# Patient Record
Sex: MALE | Race: WHITE | NOT HISPANIC OR LATINO | Employment: OTHER | URBAN - METROPOLITAN AREA
[De-identification: names, ages, dates, MRNs, and addresses within clinical notes are randomized per-mention and may not be internally consistent; named-entity substitution may affect disease eponyms.]

---

## 2023-01-01 ENCOUNTER — PATIENT OUTREACH (OUTPATIENT)
Dept: PRIMARY CARE | Facility: CLINIC | Age: 68
End: 2023-01-01
Payer: COMMERCIAL

## 2023-01-01 ENCOUNTER — OFFICE VISIT (OUTPATIENT)
Dept: CARDIOLOGY | Facility: CLINIC | Age: 68
End: 2023-01-01
Payer: COMMERCIAL

## 2023-01-01 ENCOUNTER — TELEPHONE (OUTPATIENT)
Dept: PRIMARY CARE | Facility: CLINIC | Age: 68
End: 2023-01-01
Payer: COMMERCIAL

## 2023-01-01 ENCOUNTER — OFFICE VISIT (OUTPATIENT)
Dept: PRIMARY CARE | Facility: CLINIC | Age: 68
End: 2023-01-01
Payer: COMMERCIAL

## 2023-01-01 ENCOUNTER — LAB (OUTPATIENT)
Dept: LAB | Facility: LAB | Age: 68
End: 2023-01-01
Payer: COMMERCIAL

## 2023-01-01 ENCOUNTER — OFFICE VISIT (OUTPATIENT)
Dept: CARDIOLOGY | Facility: HOSPITAL | Age: 68
End: 2023-01-01
Payer: COMMERCIAL

## 2023-01-01 ENCOUNTER — HOSPITAL ENCOUNTER (EMERGENCY)
Dept: DATA CONVERSION | Facility: HOSPITAL | Age: 68
Discharge: ANOTHER HOSPITAL | End: 2023-02-19
Attending: NURSE PRACTITIONER

## 2023-01-01 VITALS
RESPIRATION RATE: 18 BRPM | BODY MASS INDEX: 32.37 KG/M2 | DIASTOLIC BLOOD PRESSURE: 86 MMHG | SYSTOLIC BLOOD PRESSURE: 153 MMHG | WEIGHT: 219.2 LBS | HEART RATE: 57 BPM | OXYGEN SATURATION: 94 %

## 2023-01-01 VITALS
HEART RATE: 59 BPM | SYSTOLIC BLOOD PRESSURE: 124 MMHG | HEIGHT: 69 IN | BODY MASS INDEX: 32.14 KG/M2 | RESPIRATION RATE: 18 BRPM | OXYGEN SATURATION: 97 % | DIASTOLIC BLOOD PRESSURE: 84 MMHG | WEIGHT: 217 LBS

## 2023-01-01 VITALS
OXYGEN SATURATION: 97 % | DIASTOLIC BLOOD PRESSURE: 66 MMHG | BODY MASS INDEX: 32.91 KG/M2 | SYSTOLIC BLOOD PRESSURE: 118 MMHG | RESPIRATION RATE: 16 BRPM | WEIGHT: 222.2 LBS | HEIGHT: 69 IN | HEART RATE: 56 BPM

## 2023-01-01 VITALS
HEART RATE: 53 BPM | WEIGHT: 224 LBS | HEIGHT: 69 IN | SYSTOLIC BLOOD PRESSURE: 182 MMHG | DIASTOLIC BLOOD PRESSURE: 100 MMHG | BODY MASS INDEX: 33.18 KG/M2

## 2023-01-01 VITALS
HEIGHT: 69 IN | DIASTOLIC BLOOD PRESSURE: 70 MMHG | WEIGHT: 215 LBS | RESPIRATION RATE: 16 BRPM | HEART RATE: 55 BPM | OXYGEN SATURATION: 96 % | BODY MASS INDEX: 31.84 KG/M2 | SYSTOLIC BLOOD PRESSURE: 126 MMHG

## 2023-01-01 DIAGNOSIS — I10 PRIMARY HYPERTENSION: ICD-10-CM

## 2023-01-01 DIAGNOSIS — Z87.891 PERSONAL HISTORY OF NICOTINE DEPENDENCE: ICD-10-CM

## 2023-01-01 DIAGNOSIS — R79.9 ABNORMAL FINDING OF BLOOD CHEMISTRY, UNSPECIFIED: ICD-10-CM

## 2023-01-01 DIAGNOSIS — I10 PRIMARY HYPERTENSION: Primary | ICD-10-CM

## 2023-01-01 DIAGNOSIS — S27.339A: ICD-10-CM

## 2023-01-01 DIAGNOSIS — N18.6 END STAGE RENAL DISEASE (MULTI): ICD-10-CM

## 2023-01-01 DIAGNOSIS — K21.9 GASTROESOPHAGEAL REFLUX DISEASE WITHOUT ESOPHAGITIS: ICD-10-CM

## 2023-01-01 DIAGNOSIS — Z11.59 ENCOUNTER FOR HEPATITIS C SCREENING TEST FOR LOW RISK PATIENT: ICD-10-CM

## 2023-01-01 DIAGNOSIS — Z79.01 LONG TERM (CURRENT) USE OF ANTICOAGULANTS: ICD-10-CM

## 2023-01-01 DIAGNOSIS — K66.1 HEMOPERITONEUM: ICD-10-CM

## 2023-01-01 DIAGNOSIS — K21.9 GASTRO-ESOPHAGEAL REFLUX DISEASE WITHOUT ESOPHAGITIS: ICD-10-CM

## 2023-01-01 DIAGNOSIS — R40.2252 COMA SCALE, BEST VERBAL RESPONSE, ORIENTED, AT ARRIVAL TO EMERGENCY DEPARTMENT: ICD-10-CM

## 2023-01-01 DIAGNOSIS — I50.9 HEART FAILURE, UNSPECIFIED (MULTI): ICD-10-CM

## 2023-01-01 DIAGNOSIS — Z95.5 PRESENCE OF CORONARY ANGIOPLASTY IMPLANT AND GRAFT: ICD-10-CM

## 2023-01-01 DIAGNOSIS — S06.5X0A TRAUMATIC SUBDURAL HEMORRHAGE WITHOUT LOSS OF CONSCIOUSNESS, INITIAL ENCOUNTER (MULTI): ICD-10-CM

## 2023-01-01 DIAGNOSIS — S36.039A UNSPECIFIED LACERATION OF SPLEEN, INITIAL ENCOUNTER: ICD-10-CM

## 2023-01-01 DIAGNOSIS — I50.22 CHRONIC SYSTOLIC HEART FAILURE (MULTI): Primary | Chronic | ICD-10-CM

## 2023-01-01 DIAGNOSIS — R79.89 OTHER SPECIFIED ABNORMAL FINDINGS OF BLOOD CHEMISTRY: ICD-10-CM

## 2023-01-01 DIAGNOSIS — I50.22 CHRONIC SYSTOLIC HEART FAILURE (MULTI): ICD-10-CM

## 2023-01-01 DIAGNOSIS — J44.9 CHRONIC OBSTRUCTIVE PULMONARY DISEASE, UNSPECIFIED (MULTI): ICD-10-CM

## 2023-01-01 DIAGNOSIS — E78.49 OTHER HYPERLIPIDEMIA: ICD-10-CM

## 2023-01-01 DIAGNOSIS — J44.9 CHRONIC OBSTRUCTIVE PULMONARY DISEASE, UNSPECIFIED COPD TYPE (MULTI): ICD-10-CM

## 2023-01-01 DIAGNOSIS — Z99.2 DIALYSIS PATIENT (CMS-HCC): ICD-10-CM

## 2023-01-01 DIAGNOSIS — E03.9 ACQUIRED HYPOTHYROIDISM: ICD-10-CM

## 2023-01-01 DIAGNOSIS — R00.2 PALPITATION: ICD-10-CM

## 2023-01-01 DIAGNOSIS — N63.41 SUBAREOLAR MASS OF RIGHT BREAST: ICD-10-CM

## 2023-01-01 DIAGNOSIS — Z79.899 OTHER LONG TERM (CURRENT) DRUG THERAPY: ICD-10-CM

## 2023-01-01 DIAGNOSIS — Z09 HOSPITAL DISCHARGE FOLLOW-UP: Primary | ICD-10-CM

## 2023-01-01 DIAGNOSIS — S01.511A LACERATION WITHOUT FOREIGN BODY OF LIP, INITIAL ENCOUNTER: ICD-10-CM

## 2023-01-01 DIAGNOSIS — E78.5 HYPERLIPIDEMIA, UNSPECIFIED: ICD-10-CM

## 2023-01-01 DIAGNOSIS — R40.2142 COMA SCALE, EYES OPEN, SPONTANEOUS, AT ARRIVAL TO EMERGENCY DEPARTMENT: ICD-10-CM

## 2023-01-01 DIAGNOSIS — R07.81 PLEURODYNIA: ICD-10-CM

## 2023-01-01 DIAGNOSIS — R10.11 RIGHT UPPER QUADRANT PAIN: ICD-10-CM

## 2023-01-01 DIAGNOSIS — Z99.2 DEPENDENCE ON RENAL DIALYSIS (CMS-HCC): ICD-10-CM

## 2023-01-01 DIAGNOSIS — Z79.82 LONG TERM (CURRENT) USE OF ASPIRIN: ICD-10-CM

## 2023-01-01 DIAGNOSIS — R40.2362 COMA SCALE, BEST MOTOR RESPONSE, OBEYS COMMANDS, AT ARRIVAL TO EMERGENCY DEPARTMENT: ICD-10-CM

## 2023-01-01 DIAGNOSIS — M79.601 PAIN IN RIGHT ARM: ICD-10-CM

## 2023-01-01 DIAGNOSIS — S40.011A CONTUSION OF RIGHT SHOULDER, INITIAL ENCOUNTER: ICD-10-CM

## 2023-01-01 DIAGNOSIS — Z00.00 MEDICARE ANNUAL WELLNESS VISIT, SUBSEQUENT: Primary | ICD-10-CM

## 2023-01-01 DIAGNOSIS — I25.118 ATHEROSCLEROTIC HEART DISEASE OF NATIVE CORONARY ARTERY WITH OTHER FORMS OF ANGINA PECTORIS (CMS-HCC): Chronic | ICD-10-CM

## 2023-01-01 DIAGNOSIS — Z20.822 CONTACT WITH AND (SUSPECTED) EXPOSURE TO COVID-19: ICD-10-CM

## 2023-01-01 DIAGNOSIS — I13.2 HYPERTENSIVE HEART AND CHRONIC KIDNEY DISEASE WITH HEART FAILURE AND WITH STAGE 5 CHRONIC KIDNEY DISEASE, OR END STAGE RENAL DISEASE (MULTI): ICD-10-CM
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
## 2023-01-01 DIAGNOSIS — I25.118 ATHEROSCLEROTIC HEART DISEASE OF NATIVE CORONARY ARTERY WITH OTHER FORMS OF ANGINA PECTORIS (CMS-HCC): ICD-10-CM

## 2023-01-01 DIAGNOSIS — I25.2 OLD MYOCARDIAL INFARCTION: ICD-10-CM

## 2023-01-01 LAB
1,25(OH)2D3 SERPL-MCNC: 26.3 PG/ML (ref 19.9–79.3)
ABO GROUP (TYPE) IN BLOOD: NORMAL
ALBUMIN SERPL BCP-MCNC: 4.1 G/DL (ref 3.4–5)
ALP SERPL-CCNC: 75 U/L (ref 33–136)
ALT SERPL W P-5'-P-CCNC: 14 U/L (ref 10–52)
ANION GAP IN SER/PLAS: 16 MMOL/L (ref 10–20)
ANION GAP SERPL CALC-SCNC: 18 MMOL/L (ref 10–20)
ANTIBODY SCREEN: NORMAL
AST SERPL W P-5'-P-CCNC: 13 U/L (ref 9–39)
BASOPHILS # BLD AUTO: 0.09 X10*3/UL (ref 0–0.1)
BASOPHILS NFR BLD AUTO: 1.3 %
BILIRUB SERPL-MCNC: 0.6 MG/DL (ref 0–1.2)
BUN SERPL-MCNC: 71 MG/DL (ref 6–23)
CALCIUM (MG/DL) IN SER/PLAS: 9 MG/DL (ref 8.6–10.3)
CALCIUM SERPL-MCNC: 9 MG/DL (ref 8.6–10.3)
CARBON DIOXIDE, TOTAL (MMOL/L) IN SER/PLAS: 31 MMOL/L (ref 21–32)
CHLORIDE (MMOL/L) IN SER/PLAS: 97 MMOL/L (ref 98–107)
CHLORIDE SERPL-SCNC: 100 MMOL/L (ref 98–107)
CHOLEST SERPL-MCNC: 89 MG/DL (ref 0–199)
CHOLESTEROL/HDL RATIO: 2.2
CO2 SERPL-SCNC: 27 MMOL/L (ref 21–32)
CREAT SERPL-MCNC: 10.85 MG/DL (ref 0.5–1.3)
CREATININE (MG/DL) IN SER/PLAS: 6.79 MG/DL (ref 0.5–1.3)
EOSINOPHIL # BLD AUTO: 0.56 X10*3/UL (ref 0–0.7)
EOSINOPHIL NFR BLD AUTO: 7.8 %
ERYTHROCYTE DISTRIBUTION WIDTH (RATIO) BY AUTOMATED COUNT: 14.4 % (ref 11.5–14.5)
ERYTHROCYTE MEAN CORPUSCULAR HEMOGLOBIN CONCENTRATION (G/DL) BY AUTOMATED: 32.1 G/DL (ref 32–36)
ERYTHROCYTE MEAN CORPUSCULAR VOLUME (FL) BY AUTOMATED COUNT: 97 FL (ref 80–100)
ERYTHROCYTE [DISTWIDTH] IN BLOOD BY AUTOMATED COUNT: 14.6 % (ref 11.5–14.5)
ERYTHROCYTES (10*6/UL) IN BLOOD BY AUTOMATED COUNT: 2.88 X10E12/L (ref 4.5–5.9)
EST. AVERAGE GLUCOSE BLD GHB EST-MCNC: 123 MG/DL
ETHANOL (MG/DL) IN SER/PLAS: <10 MG/DL
FLU A RESULT: NOT DETECTED
FLU B RESULT: NOT DETECTED
GFR MALE: 8 ML/MIN/1.73M2
GFR SERPL CREATININE-BSD FRML MDRD: 5 ML/MIN/1.73M*2
GLUCOSE (MG/DL) IN SER/PLAS: 132 MG/DL (ref 74–99)
GLUCOSE SERPL-MCNC: 82 MG/DL (ref 74–99)
HBA1C MFR BLD: 5.9 %
HCT VFR BLD AUTO: 29.4 % (ref 41–52)
HCV AB SER QL: NONREACTIVE
HDLC SERPL-MCNC: 39.6 MG/DL
HEMATOCRIT (%) IN BLOOD BY AUTOMATED COUNT: 28 % (ref 41–52)
HEMOGLOBIN (G/DL) IN BLOOD: 9 G/DL (ref 13.5–17.5)
HGB BLD-MCNC: 9.7 G/DL (ref 13.5–17.5)
IMM GRANULOCYTES # BLD AUTO: 0.03 X10*3/UL (ref 0–0.7)
IMM GRANULOCYTES NFR BLD AUTO: 0.4 % (ref 0–0.9)
INR IN PPP BY COAGULATION ASSAY: 1.1 (ref 0.9–1.1)
LDLC SERPL CALC-MCNC: 39 MG/DL
LEUKOCYTES (10*3/UL) IN BLOOD BY AUTOMATED COUNT: 11.3 X10E9/L (ref 4.4–11.3)
LYMPHOCYTES # BLD AUTO: 1.03 X10*3/UL (ref 1.2–4.8)
LYMPHOCYTES NFR BLD AUTO: 14.4 %
MCH RBC QN AUTO: 32.3 PG (ref 26–34)
MCHC RBC AUTO-ENTMCNC: 33 G/DL (ref 32–36)
MCV RBC AUTO: 98 FL (ref 80–100)
MONOCYTES # BLD AUTO: 0.63 X10*3/UL (ref 0.1–1)
MONOCYTES NFR BLD AUTO: 8.8 %
NEUTROPHILS # BLD AUTO: 4.83 X10*3/UL (ref 1.2–7.7)
NEUTROPHILS NFR BLD AUTO: 67.3 %
NON HDL CHOLESTEROL: 49 MG/DL (ref 0–149)
NRBC BLD-RTO: 0 /100 WBCS (ref 0–0)
PATIENT TEMPERATURE: 37 DEGREES C
PLATELET # BLD AUTO: 208 X10*3/UL (ref 150–450)
PLATELETS (10*3/UL) IN BLOOD AUTOMATED COUNT: 242 X10E9/L (ref 150–450)
PMV BLD AUTO: 10.6 FL (ref 7.5–11.5)
POCT ANION GAP, VENOUS: 9 MMOL/L (ref 10–25)
POCT BASE EXCESS, VENOUS: 6.9 MMOL/L (ref -2–3)
POCT CHLORIDE, VENOUS: 97 MMOL/L (ref 98–107)
POCT GLUCOSE, VENOUS: 136 MG/DL (ref 74–99)
POCT HCO3, VENOUS: 31.9 MMOL/L (ref 22–26)
POCT HEMATOCRIT, VENOUS: 26 % (ref 41–52)
POCT HEMOGLOBIN, VENOUS: 8.6 G/DL (ref 13.5–17.5)
POCT IONIZED CALCIUM, VENOUS: 1.09 MMOL/L (ref 1.1–1.33)
POCT LACTATE, VENOUS: 0.8 MMOL/L (ref 0.4–2)
POCT OXY HEMOGLOBIN, VENOUS: 57.8 % (ref 45–75)
POCT PCO2, VENOUS: 47 MMHG (ref 41–51)
POCT PH, VENOUS: 7.44 (ref 7.33–7.43)
POCT PO2, VENOUS: 35 MMHG (ref 35–45)
POCT POTASSIUM, VENOUS: 4.2 MMOL/L (ref 3.5–5.3)
POCT SO2, VENOUS: 59 % (ref 45–75)
POCT SODIUM, VENOUS: 134 MMOL/L (ref 136–145)
POTASSIUM (MMOL/L) IN SER/PLAS: 4.5 MMOL/L (ref 3.5–5.3)
POTASSIUM SERPL-SCNC: 5.8 MMOL/L (ref 3.5–5.3)
PROT SERPL-MCNC: 6.2 G/DL (ref 6.4–8.2)
PROTHROMBIN TIME (PT) IN PPP BY COAGULATION ASSAY: 12.3 SEC (ref 9.8–13.4)
RBC # BLD AUTO: 3 X10*6/UL (ref 4.5–5.9)
RH FACTOR: NORMAL
SARS-COV-2 RESULT: NOT DETECTED
SODIUM (MMOL/L) IN SER/PLAS: 139 MMOL/L (ref 136–145)
SODIUM SERPL-SCNC: 139 MMOL/L (ref 136–145)
T4 FREE SERPL-MCNC: 0.81 NG/DL (ref 0.61–1.12)
TRIGL SERPL-MCNC: 50 MG/DL (ref 0–149)
TSH SERPL-ACNC: 6.66 MIU/L (ref 0.44–3.98)
UREA NITROGEN (MG/DL) IN SER/PLAS: 41 MG/DL (ref 6–23)
VIT B12 SERPL-MCNC: 456 PG/ML (ref 211–911)
VLDL: 10 MG/DL (ref 0–40)
WBC # BLD AUTO: 7.2 X10*3/UL (ref 4.4–11.3)

## 2023-01-01 PROCEDURE — 1126F AMNT PAIN NOTED NONE PRSNT: CPT | Performed by: STUDENT IN AN ORGANIZED HEALTH CARE EDUCATION/TRAINING PROGRAM

## 2023-01-01 PROCEDURE — 3080F DIAST BP >= 90 MM HG: CPT | Performed by: INTERNAL MEDICINE

## 2023-01-01 PROCEDURE — 36415 COLL VENOUS BLD VENIPUNCTURE: CPT

## 2023-01-01 PROCEDURE — 3074F SYST BP LT 130 MM HG: CPT | Performed by: STUDENT IN AN ORGANIZED HEALTH CARE EDUCATION/TRAINING PROGRAM

## 2023-01-01 PROCEDURE — 3008F BODY MASS INDEX DOCD: CPT | Performed by: INTERNAL MEDICINE

## 2023-01-01 PROCEDURE — 1159F MED LIST DOCD IN RCRD: CPT | Performed by: STUDENT IN AN ORGANIZED HEALTH CARE EDUCATION/TRAINING PROGRAM

## 2023-01-01 PROCEDURE — 1036F TOBACCO NON-USER: CPT | Performed by: STUDENT IN AN ORGANIZED HEALTH CARE EDUCATION/TRAINING PROGRAM

## 2023-01-01 PROCEDURE — 99213 OFFICE O/P EST LOW 20 MIN: CPT | Performed by: INTERNAL MEDICINE

## 2023-01-01 PROCEDURE — 1160F RVW MEDS BY RX/DR IN RCRD: CPT | Performed by: STUDENT IN AN ORGANIZED HEALTH CARE EDUCATION/TRAINING PROGRAM

## 2023-01-01 PROCEDURE — 99495 TRANSJ CARE MGMT MOD F2F 14D: CPT | Performed by: STUDENT IN AN ORGANIZED HEALTH CARE EDUCATION/TRAINING PROGRAM

## 2023-01-01 PROCEDURE — 80061 LIPID PANEL: CPT

## 2023-01-01 PROCEDURE — G0439 PPPS, SUBSEQ VISIT: HCPCS | Performed by: STUDENT IN AN ORGANIZED HEALTH CARE EDUCATION/TRAINING PROGRAM

## 2023-01-01 PROCEDURE — 82607 VITAMIN B-12: CPT

## 2023-01-01 PROCEDURE — 3008F BODY MASS INDEX DOCD: CPT | Performed by: STUDENT IN AN ORGANIZED HEALTH CARE EDUCATION/TRAINING PROGRAM

## 2023-01-01 PROCEDURE — 99397 PER PM REEVAL EST PAT 65+ YR: CPT | Performed by: STUDENT IN AN ORGANIZED HEALTH CARE EDUCATION/TRAINING PROGRAM

## 2023-01-01 PROCEDURE — 86803 HEPATITIS C AB TEST: CPT

## 2023-01-01 PROCEDURE — 83036 HEMOGLOBIN GLYCOSYLATED A1C: CPT

## 2023-01-01 PROCEDURE — 85025 COMPLETE CBC W/AUTO DIFF WBC: CPT

## 2023-01-01 PROCEDURE — 1170F FXNL STATUS ASSESSED: CPT | Performed by: STUDENT IN AN ORGANIZED HEALTH CARE EDUCATION/TRAINING PROGRAM

## 2023-01-01 PROCEDURE — 3079F DIAST BP 80-89 MM HG: CPT | Performed by: STUDENT IN AN ORGANIZED HEALTH CARE EDUCATION/TRAINING PROGRAM

## 2023-01-01 PROCEDURE — 1036F TOBACCO NON-USER: CPT | Performed by: INTERNAL MEDICINE

## 2023-01-01 PROCEDURE — 1159F MED LIST DOCD IN RCRD: CPT | Performed by: INTERNAL MEDICINE

## 2023-01-01 PROCEDURE — 99215 OFFICE O/P EST HI 40 MIN: CPT | Performed by: STUDENT IN AN ORGANIZED HEALTH CARE EDUCATION/TRAINING PROGRAM

## 2023-01-01 PROCEDURE — 1160F RVW MEDS BY RX/DR IN RCRD: CPT | Performed by: INTERNAL MEDICINE

## 2023-01-01 PROCEDURE — 3077F SYST BP >= 140 MM HG: CPT | Performed by: INTERNAL MEDICINE

## 2023-01-01 PROCEDURE — 99214 OFFICE O/P EST MOD 30 MIN: CPT | Performed by: STUDENT IN AN ORGANIZED HEALTH CARE EDUCATION/TRAINING PROGRAM

## 2023-01-01 PROCEDURE — 82652 VIT D 1 25-DIHYDROXY: CPT

## 2023-01-01 PROCEDURE — 1126F AMNT PAIN NOTED NONE PRSNT: CPT | Performed by: INTERNAL MEDICINE

## 2023-01-01 PROCEDURE — 1157F ADVNC CARE PLAN IN RCRD: CPT | Performed by: STUDENT IN AN ORGANIZED HEALTH CARE EDUCATION/TRAINING PROGRAM

## 2023-01-01 PROCEDURE — 3077F SYST BP >= 140 MM HG: CPT | Performed by: STUDENT IN AN ORGANIZED HEALTH CARE EDUCATION/TRAINING PROGRAM

## 2023-01-01 PROCEDURE — 84439 ASSAY OF FREE THYROXINE: CPT

## 2023-01-01 PROCEDURE — 84443 ASSAY THYROID STIM HORMONE: CPT

## 2023-01-01 PROCEDURE — 3078F DIAST BP <80 MM HG: CPT | Performed by: STUDENT IN AN ORGANIZED HEALTH CARE EDUCATION/TRAINING PROGRAM

## 2023-01-01 PROCEDURE — 99205 OFFICE O/P NEW HI 60 MIN: CPT | Performed by: STUDENT IN AN ORGANIZED HEALTH CARE EDUCATION/TRAINING PROGRAM

## 2023-01-01 PROCEDURE — 80053 COMPREHEN METABOLIC PANEL: CPT

## 2023-01-01 RX ORDER — FUROSEMIDE 80 MG/1
80 TABLET ORAL 2 TIMES DAILY
Qty: 180 TABLET | Refills: 3 | Status: SHIPPED | OUTPATIENT
Start: 2023-01-01 | End: 2024-01-24

## 2023-01-01 RX ORDER — ISOSORBIDE DINITRATE 30 MG/1
30 TABLET ORAL 3 TIMES DAILY
COMMUNITY
Start: 2023-01-01 | End: 2023-01-01 | Stop reason: ALTCHOICE

## 2023-01-01 RX ORDER — CLONIDINE HYDROCHLORIDE 0.3 MG/1
TABLET ORAL 2 TIMES DAILY
COMMUNITY
Start: 2023-01-01

## 2023-01-01 RX ORDER — ISOSORBIDE MONONITRATE 30 MG/1
30 TABLET, EXTENDED RELEASE ORAL DAILY
Qty: 90 TABLET | Refills: 3 | Status: SHIPPED | OUTPATIENT
Start: 2023-01-01 | End: 2024-01-24

## 2023-01-01 RX ORDER — PANTOPRAZOLE SODIUM 40 MG/1
40 TABLET, DELAYED RELEASE ORAL DAILY PRN
Qty: 90 TABLET | Refills: 3 | Status: SHIPPED | OUTPATIENT
Start: 2023-01-01 | End: 2023-01-01 | Stop reason: SDUPTHER

## 2023-01-01 RX ORDER — PANTOPRAZOLE SODIUM 40 MG/1
40 TABLET, DELAYED RELEASE ORAL DAILY PRN
Qty: 90 TABLET | Refills: 3 | Status: SHIPPED | OUTPATIENT
Start: 2023-01-01 | End: 2024-01-24

## 2023-01-01 RX ORDER — PANTOPRAZOLE SODIUM 40 MG/1
40 TABLET, DELAYED RELEASE ORAL
Qty: 90 TABLET | Refills: 1 | Status: SHIPPED | OUTPATIENT
Start: 2023-01-01 | End: 2023-01-01 | Stop reason: SDUPTHER

## 2023-01-01 RX ORDER — MINOXIDIL 2.5 MG/1
2.5 TABLET ORAL DAILY
COMMUNITY
Start: 2023-01-01

## 2023-01-01 RX ORDER — ISOSORBIDE MONONITRATE 60 MG/1
60 TABLET, EXTENDED RELEASE ORAL DAILY
Qty: 90 TABLET | Refills: 3 | Status: SHIPPED | OUTPATIENT
Start: 2023-01-01 | End: 2024-01-24

## 2023-01-01 SDOH — ECONOMIC STABILITY: FOOD INSECURITY: WITHIN THE PAST 12 MONTHS, THE FOOD YOU BOUGHT JUST DIDN'T LAST AND YOU DIDN'T HAVE MONEY TO GET MORE.: NEVER TRUE

## 2023-01-01 SDOH — ECONOMIC STABILITY: FOOD INSECURITY: WITHIN THE PAST 12 MONTHS, YOU WORRIED THAT YOUR FOOD WOULD RUN OUT BEFORE YOU GOT MONEY TO BUY MORE.: NEVER TRUE

## 2023-01-01 ASSESSMENT — ACTIVITIES OF DAILY LIVING (ADL)
GROCERY_SHOPPING: INDEPENDENT
DOING_HOUSEWORK: INDEPENDENT
MANAGING_FINANCES: INDEPENDENT
BATHING: INDEPENDENT
TAKING_MEDICATION: INDEPENDENT
DRESSING: INDEPENDENT

## 2023-01-01 ASSESSMENT — PATIENT HEALTH QUESTIONNAIRE - PHQ9
SUM OF ALL RESPONSES TO PHQ9 QUESTIONS 1 AND 2: 0
SUM OF ALL RESPONSES TO PHQ9 QUESTIONS 1 AND 2: 0
1. LITTLE INTEREST OR PLEASURE IN DOING THINGS: NOT AT ALL
2. FEELING DOWN, DEPRESSED OR HOPELESS: NOT AT ALL
2. FEELING DOWN, DEPRESSED OR HOPELESS: NOT AT ALL
1. LITTLE INTEREST OR PLEASURE IN DOING THINGS: NOT AT ALL

## 2023-01-01 ASSESSMENT — ENCOUNTER SYMPTOMS
SHORTNESS OF BREATH: 0
POLYDIPSIA: 0
BRUISES/BLEEDS EASILY: 0
LOSS OF SENSATION IN FEET: 0
SINUS PAIN: 0
POLYPHAGIA: 0
COUGH: 0
FACIAL ASYMMETRY: 0
ABDOMINAL PAIN: 0
LIGHT-HEADEDNESS: 0
DEPRESSION: 1
DECREASED CONCENTRATION: 0
SHORTNESS OF BREATH: 0
CONFUSION: 0
DEPRESSION: 0
WEAKNESS: 0
DYSPHORIC MOOD: 0
SLEEP DISTURBANCE: 0
DEPRESSION: 0
LOSS OF SENSATION IN FEET: 0
OCCASIONAL FEELINGS OF UNSTEADINESS: 0
OCCASIONAL FEELINGS OF UNSTEADINESS: 0
CONFUSION: 0
EYE DISCHARGE: 0
ABDOMINAL DISTENTION: 0
CONSTIPATION: 0
LOSS OF SENSATION IN FEET: 0
NERVOUS/ANXIOUS: 0
PALPITATIONS: 0
DIZZINESS: 0
WHEEZING: 0
FATIGUE: 0
FATIGUE: 0
DEPRESSION: 1
ARTHRALGIAS: 1
TROUBLE SWALLOWING: 0
CHILLS: 0
LOSS OF SENSATION IN FEET: 0
HEADACHES: 0
WOUND: 0
HEMATURIA: 0
BLOOD IN STOOL: 0
NAUSEA: 0
COUGH: 0
OCCASIONAL FEELINGS OF UNSTEADINESS: 0
OCCASIONAL FEELINGS OF UNSTEADINESS: 0
FEVER: 0
FEVER: 0
ACTIVITY CHANGE: 0

## 2023-01-01 ASSESSMENT — COLUMBIA-SUICIDE SEVERITY RATING SCALE - C-SSRS
2. HAVE YOU ACTUALLY HAD ANY THOUGHTS OF KILLING YOURSELF?: NO
6. HAVE YOU EVER DONE ANYTHING, STARTED TO DO ANYTHING, OR PREPARED TO DO ANYTHING TO END YOUR LIFE?: NO
1. IN THE PAST MONTH, HAVE YOU WISHED YOU WERE DEAD OR WISHED YOU COULD GO TO SLEEP AND NOT WAKE UP?: NO

## 2023-04-10 NOTE — PROGRESS NOTES
Discharge Facility: [facility]  Discharge Diagnosis: Acute hypoxic respiratory failure, Acute on chronic combined systolic and diastolic congestive heart failure, Pulmonary Edema, Hypertensive Emergency, ESRD w/ dialysis  Admission Date: 7 Apr 2023  Discharge Date: 9 Apr 2023    PCP appt: 13 Apr 2023    Engagement  Call Start Time: 1220 (4/10/2023 12:25 PM)    Medications  Medications reviewed with patient/caregiver?: No (pt stating no new meds were prescribed to him so he did not wish to review the meds he is to continue taking.) (4/10/2023 12:25 PM)  Is the patient having any side effects they believe may be caused by any medication additions or changes?: No (4/10/2023 12:25 PM)  Does the patient have all medications ordered at discharge?: Yes (4/10/2023 12:25 PM)  Care Management Interventions: No intervention needed (4/10/2023 12:25 PM)  Is the patient taking all medications as directed (includes completed medication regime)?: Yes (4/10/2023 12:25 PM)    Appointments  Does the patient have a primary care provider?: Yes (scheduled appt with patient's PCP for 13 Apr 2023) (4/10/2023 12:25 PM)  Care Management Interventions: Verified appointment date/time/provider; Educated patient on importance of making appointment (4/10/2023 12:25 PM)  Has the patient kept scheduled appointments due by today?: Yes (4/10/2023 12:25 PM)  Care Management Interventions: Advised patient to keep appointment; Educated on importance of keeping appointment (4/10/2023 12:25 PM)    Self Management  What is the home health agency?: N/A (4/10/2023 12:25 PM)  Has home health visited the patient within 72 hours of discharge?: Not applicable (4/10/2023 12:25 PM)  What Durable Medical Equipment (DME) was ordered?: N/A (4/10/2023 12:25 PM)    Patient Teaching  Does the patient have access to their discharge instructions?: Yes (4/10/2023 12:25 PM)  Care Management Interventions: Reviewed instructions with patient (4/10/2023 12:25 PM)  What is the  patient's perception of their health status since discharge?: Improving (4/10/2023 12:25 PM)  Is the patient/caregiver able to teach back the hierarchy of who to call/visit for symptoms/problems? PCP, Specialist, Home Health nurse, Urgent Care, ED, 911: Yes (4/10/2023 12:25 PM)    Wrap Up  Call End Time: 1229 (4/10/2023 12:25 PM)

## 2023-04-13 PROBLEM — Z98.61 S/P PTCA (PERCUTANEOUS TRANSLUMINAL CORONARY ANGIOPLASTY): Status: ACTIVE | Noted: 2023-01-01

## 2023-04-13 PROBLEM — J44.9 COPD (CHRONIC OBSTRUCTIVE PULMONARY DISEASE) (MULTI): Status: ACTIVE | Noted: 2021-08-23

## 2023-04-13 PROBLEM — E66.9 OBESITY: Status: ACTIVE | Noted: 2023-01-01

## 2023-04-13 PROBLEM — N19 RENAL FAILURE: Status: ACTIVE | Noted: 2023-01-01

## 2023-04-13 PROBLEM — N18.6 END STAGE RENAL DISEASE (MULTI): Status: ACTIVE | Noted: 2021-08-23

## 2023-04-13 PROBLEM — N25.81 HYPERPARATHYROIDISM DUE TO RENAL INSUFFICIENCY (MULTI): Status: ACTIVE | Noted: 2021-12-21

## 2023-04-13 PROBLEM — I34.0 TRACE MITRAL REGURGITATION BY PRIOR ECHOCARDIOGRAM: Status: ACTIVE | Noted: 2023-01-01

## 2023-04-13 PROBLEM — I25.10 CAD IN NATIVE ARTERY: Status: ACTIVE | Noted: 2023-01-01

## 2023-04-13 PROBLEM — I50.22 CHRONIC SYSTOLIC HEART FAILURE (MULTI): Status: ACTIVE | Noted: 2023-01-01

## 2023-04-13 PROBLEM — D63.1 ANEMIA OF CHRONIC RENAL FAILURE: Status: ACTIVE | Noted: 2021-09-06

## 2023-04-13 PROBLEM — G47.33 OSA (OBSTRUCTIVE SLEEP APNEA): Status: ACTIVE | Noted: 2023-01-01

## 2023-04-13 PROBLEM — I35.1 MODERATE AORTIC REGURGITATION: Status: ACTIVE | Noted: 2023-01-01

## 2023-04-13 PROBLEM — E55.9 VITAMIN D DEFICIENCY: Status: ACTIVE | Noted: 2023-01-01

## 2023-04-13 PROBLEM — J30.9 ALLERGIC RHINITIS: Status: ACTIVE | Noted: 2023-01-01

## 2023-04-13 PROBLEM — N18.9 ANEMIA OF CHRONIC RENAL FAILURE: Status: ACTIVE | Noted: 2021-09-06

## 2023-04-13 PROBLEM — Z99.2 DIALYSIS PATIENT (CMS-HCC): Status: ACTIVE | Noted: 2023-01-01

## 2023-04-13 PROBLEM — I50.22 CHRONIC SYSTOLIC HEART FAILURE (MULTI): Chronic | Status: ACTIVE | Noted: 2023-01-01

## 2023-04-13 PROBLEM — K21.9 GERD (GASTROESOPHAGEAL REFLUX DISEASE): Status: ACTIVE | Noted: 2021-08-23

## 2023-04-13 PROBLEM — J81.1 PULMONARY EDEMA (HHS-HCC): Status: ACTIVE | Noted: 2022-11-14

## 2023-04-13 PROBLEM — I71.21 ANEURYSM, ASCENDING AORTA (CMS-HCC): Status: ACTIVE | Noted: 2023-01-01

## 2023-04-14 NOTE — PROGRESS NOTES
Call regarding apt with PCP on (13Apr23) after hospitalization for (CHF, ESRD).  At time of outreach call the patient feels as if their condition has improved since last visit.  Patient verbalizes understanding of new orders including labs, therapy, HHC, and DME.   Patient verbalizes understanding of medications including changes made during PCP  Patient verbalizes understanding of referrals needed to specialist.  Patient verbalized understanding plan of care   Any further questions or concerns at this time for PCP? No  Does patient appear to have CCM needs and will need referral to nurse after call back? No

## 2023-05-10 NOTE — PROGRESS NOTES
Unable to reach patient for one month post discharge follow up call.   LVM with call back number for patient to call if needed   If no voicemail available call attempts x 2 were made to contact the patient to assist with any questions or concerns patient may have.

## 2023-06-08 PROBLEM — R06.02 SHORTNESS OF BREATH: Status: RESOLVED | Noted: 2021-08-23 | Resolved: 2023-01-01

## 2023-06-14 NOTE — PROGRESS NOTES
Discharge Facility: [facility]  Discharge Diagnosis: Impression 1: Flash pulmonary edema; acute on chronic systolic and diastolic heart failure  Impression 2: Hypertensive Emergency  Impression 3: ESRD on HD; Hyperkalemia  Impression 4: Acute hypoxic hypercarbic respiratory failure  Impression 5: Chest pain (resolved); Minimally elevated troponin; Coronary artery disease  Impression 6: COPD  Impression 7: [place]  Impression 8: Anxiety / depression  Impression 9: DVT Prophylaxis  Admission Date: 11 June 23  Discharge Date: 13 June 23    PCP Appointment Date: Tasked to PCP office  Specialist Appointment Date: Suggested pt follow up with Cardiology after discharge.   Hospital Encounter and Summary: Linked  See discharge assessment below for further details     Engagement  Call Start Time: 0900 (6/14/2023  9:10 AM)    Medications  Medications reviewed with patient/caregiver?: Yes (6/14/2023  9:10 AM)  Is the patient having any side effects they believe may be caused by any medication additions or changes?: No (6/14/2023  9:10 AM)  Does the patient have all medications ordered at discharge?: Yes (6/14/2023  9:10 AM)  Is the patient taking all medications as directed (includes completed medication regime)?: Yes (6/14/2023  9:10 AM)  Medication Comments: pt has no questions, concerns, or issues with medication at time of outreach. (6/14/2023  9:10 AM)    Appointments  Does the patient have a primary care provider?: Yes (Tasked to PCP office for scheduling) (6/14/2023  9:10 AM)  Has the patient kept scheduled appointments due by today?: No (6/14/2023  9:10 AM)  What is preventing the patient from keeping their appointments?: Doesn't understand importance (6/14/2023  9:10 AM)  Care Management Interventions: Advised patient to keep appointment (6/14/2023  9:10 AM)    Self Management  What is the home health agency?: na (6/14/2023  9:10 AM)  Has home health visited the patient within 72 hours of discharge?: Not applicable (6/14/2023   9:10 AM)  What Durable Medical Equipment (DME) was ordered?: na (6/14/2023  9:10 AM)    Patient Teaching  Does the patient have access to their discharge instructions?: Yes (6/14/2023  9:10 AM)  Care Management Interventions: Reviewed instructions with patient (6/14/2023  9:10 AM)  What is the patient's perception of their health status since discharge?: Improving (6/14/2023  9:10 AM)  Is the patient/caregiver able to teach back the hierarchy of who to call/visit for symptoms/problems? PCP, Specialist, Home Health nurse, Urgent Care, ED, 911: Yes (6/14/2023  9:10 AM)    Wrap Up  Call End Time: 0910 (6/14/2023  9:10 AM)     Pt grateful for outreach call.

## 2023-06-15 NOTE — TELEPHONE ENCOUNTER
----- Message from Onelia Peacock sent at 6/14/2023  9:39 AM EDT -----  Regarding: FW: no available appt within 14 days for me to schedule    ----- Message -----  From: Angel Schofield RN  Sent: 6/14/2023   9:09 AM EDT  To: Do Sawant[facility] Clerical  Subject: no available appt within 14 days for me to s#    This patient was discharged from: [facility]  Discharge diagnosis: Impression 1: Flash pulmonary edema; acute on chronic systolic and diastolic heart failure  Impression 2: Hypertensive Emergency  Impression 3: ESRD on HD; Hyperkalemia  Impression 4: Acute hypoxic hypercarbic respiratory failure  Impression 5: Chest pain (resolved); Minimally elevated troponin; Coronary artery disease  Impression 6: COPD  Impression 7: [place]  Impression 8: Anxiety / depression  Impression 9: DVT Prophylaxis    Date of discharge: 13 June 23       No PCP appointments available within 14 days of discharge.   Please reach out to patient and schedule an appointment within 7-13 days from discharge date.

## 2023-06-21 PROBLEM — Z09 HOSPITAL DISCHARGE FOLLOW-UP: Status: ACTIVE | Noted: 2023-01-01

## 2023-06-21 NOTE — PATIENT INSTRUCTIONS
We reviewed your medications together.  Lisinopril was found on your med list but you are not supposed to be taking this medicine.  We have removed it from your medication list.  It was not on the med list from which she was discharged from the hospital.    We will refer you to our clinical pharmacist to do a med reconciliation with you.    Continue to take your medication as prescribed    You did not qualify for oxygen therapy at this time we will continue to evaluate you in each visit.  If you are ever short of breath please call or go to the emergency room.     If you need anything or have any questions, please call the clinic at [phone]     Follow up as scheduled with your pcp

## 2023-06-21 NOTE — PROGRESS NOTES
"Patient: Flex Vargas  : 1955  PCP: Jasmyn Josue DO  MRN: [id number]  Program: No linked episodes     Flex Vargas is a 67 y.o. male presenting today for follow-up after being discharged from the hospital {Numbers; 1-14:58982} days ago. The main problem requiring admission was ***. The discharge summary and/or Transitional Care Management documentation was reviewed. Medication reconciliation was performed as indicated via the \"Daniel as Reviewed\" timestamp.     Flex Vargas was contacted by Transitional Care Management services two days after his discharge. This encounter and supporting documentation was reviewed.    The complexity of medical decision making for this patient's transitional care is {DESC; MODERATE/HIGH:46395}.    Physical Exam    Assessment/Plan   {Assess/PlanSmartLinks:98608}    Review of Systems    No family history on file.    Engagement  Call Start Time: 0900 (2023  9:10 AM)    Medications  Medications reviewed with patient/caregiver?: Yes (2023  9:10 AM)  Is the patient having any side effects they believe may be caused by any medication additions or changes?: No (2023  9:10 AM)  Does the patient have all medications ordered at discharge?: Yes (2023  9:10 AM)  Is the patient taking all medications as directed (includes completed medication regime)?: Yes (2023  9:10 AM)  Medication Comments: pt has no questions, concerns, or issues with medication at time of outreach. (2023  9:10 AM)    Appointments  Does the patient have a primary care provider?: Yes (Tasked to PCP office for scheduling) (2023  9:10 AM)  Has the patient kept scheduled appointments due by today?: No (2023  9:10 AM)  What is preventing the patient from keeping their appointments?: Doesn't understand importance (2023  9:10 AM)  Care Management Interventions: Advised patient to keep appointment (2023  9:10 AM)    Self Management  What is the home health agency?: na " (6/14/2023  9:10 AM)  Has home health visited the patient within 72 hours of discharge?: Not applicable (6/14/2023  9:10 AM)  What Durable Medical Equipment (DME) was ordered?: na (6/14/2023  9:10 AM)    Patient Teaching  Does the patient have access to their discharge instructions?: Yes (6/14/2023  9:10 AM)  Care Management Interventions: Reviewed instructions with patient (6/14/2023  9:10 AM)  What is the patient's perception of their health status since discharge?: Improving (6/14/2023  9:10 AM)  Is the patient/caregiver able to teach back the hierarchy of who to call/visit for symptoms/problems? PCP, Specialist, Home Health nurse, Urgent Care, ED, 911: Yes (6/14/2023  9:10 AM)    Wrap Up  Call End Time: 0910 (6/14/2023  9:10 AM)        No follow-ups on file.

## 2023-06-21 NOTE — PROGRESS NOTES
Patient escorted by this LPN around hospital floor for 6 minute walk test for oxygen. SPO2 maintained at 94% on room air, heart rate remained consistently in high 80's patient reported exertional SOB relieved during rest at end of test

## 2023-06-21 NOTE — PROGRESS NOTES
"Patient: Flex Vargas  : 1955  PCP: Jasmyn Josue DO  MRN: [id number]  Program: No linked episodes     Flex Vargas is a 67 y.o. male presenting today for follow-up after being discharged from the hospital 10 days ago. The main problem requiring admission was  hypertensive emergency. The discharge summary and/or Transitional Care Management documentation was reviewed. Medication reconciliation was performed as indicated via the \"Daniel as Reviewed\" timestamp.     For full hospital encounter please see discharge summary.    Flex Vargas was contacted by Transitional Care Management services two days after his discharge. This encounter and supporting documentation was reviewed.    The complexity of medical decision making for this patient's transitional care is moderate.    Physical Exam  Vitals and nursing note reviewed.   Constitutional:       Appearance: Normal appearance. He is normal weight.   HENT:      Head: Normocephalic and atraumatic.      Right Ear: Tympanic membrane normal.      Left Ear: Tympanic membrane normal.      Mouth/Throat:      Mouth: Mucous membranes are dry.   Eyes:      Extraocular Movements: Extraocular movements intact.      Conjunctiva/sclera: Conjunctivae normal.   Cardiovascular:      Rate and Rhythm: Normal rate.      Pulses: Normal pulses.   Pulmonary:      Effort: Pulmonary effort is normal. No respiratory distress.      Breath sounds: Normal breath sounds.   Abdominal:      General: Bowel sounds are normal. There is distension.      Palpations: Abdomen is soft. There is no mass.      Tenderness: There is no abdominal tenderness.   Musculoskeletal:         General: Normal range of motion.      Cervical back: Normal range of motion and neck supple.   Skin:     General: Skin is warm and dry.   Neurological:      General: No focal deficit present.      Mental Status: He is alert. Mental status is at baseline.   Psychiatric:         Mood and Affect: Mood normal. "         Assessment/Plan   Posthospital follow-up/hypertensive emergency.  This is now resolved.  Blood pressure in the office today is 124/84 with heart rate of 59.      Reviewed patient's medication with him.  There is lisinopril listed in patient's chart however patient is on Entresto and should not be taking both of them together.  Patient states that he is taking  it. Reviewed cardiology notes.  Reviewed discharge notes.  Lisinopril was not among the discharge medications.  Lisinopril has been discontinued and patient is aware    Patient with radiographic evidence of pulmonary edema.  Will order a repeat CXR to be obtained before next office visit to monitor his resolution.  Patient has no symptoms of shortness of breath.    Home oxygen evaluation completed in the office today.  Patient did not qualify for oxygen therapy.  We will continue to monitor his symptoms with testing if need be.    Patient has appointment with his PCP on 07/17/2023.      Problem List Items Addressed This Visit       Hypertension    Relevant Orders    Follow Up In [facility] - PCP    Follow Up In [facility] - Pharmacy    End stage renal disease (CMS/[facility])    Relevant Orders    Follow Up In [facility] - PCP    Follow Up In [facility] - Pharmacy    COPD (chronic obstructive pulmonary disease) (CMS/[facility])    Relevant Orders    Follow Up In [facility] - PCP    Follow Up In [facility] - Pharmacy    XR chest 2 views    Hospital discharge follow-up - Primary    Relevant Orders    Follow Up In [facility] - PCP    Follow Up In [facility] - Pharmacy       Review of Systems   Constitutional:  Negative for activity change, chills, fatigue and fever.   HENT:  Negative for congestion, ear discharge, sinus pain and trouble swallowing.    Eyes:  Negative for discharge and visual disturbance.   Respiratory:  Negative for cough, shortness of breath and wheezing.     Cardiovascular:  Negative for chest pain and leg swelling.   Gastrointestinal:  Negative for abdominal distention, abdominal pain, blood in stool, constipation and nausea.   Endocrine: Negative for polydipsia and polyphagia.   Genitourinary:  Negative for hematuria.   Musculoskeletal:  Negative for gait problem.   Skin:  Negative for wound.   Allergic/Immunologic: Negative for food allergies.   Neurological:  Negative for dizziness, weakness and headaches.   Psychiatric/Behavioral:  Negative for confusion, sleep disturbance and suicidal ideas. The patient is not nervous/anxious.        No family history on file.    Engagement  Call Start Time: 0900 (6/14/2023  9:10 AM)    Medications  Medications reviewed with patient/caregiver?: Yes (6/14/2023  9:10 AM)  Is the patient having any side effects they believe may be caused by any medication additions or changes?: No (6/14/2023  9:10 AM)  Does the patient have all medications ordered at discharge?: Yes (6/14/2023  9:10 AM)  Is the patient taking all medications as directed (includes completed medication regime)?: Yes (6/14/2023  9:10 AM)  Medication Comments: pt has no questions, concerns, or issues with medication at time of outreach. (6/14/2023  9:10 AM)    Appointments  Does the patient have a primary care provider?: Yes (Tasked to PCP office for scheduling) (6/14/2023  9:10 AM)  Has the patient kept scheduled appointments due by today?: No (6/14/2023  9:10 AM)  What is preventing the patient from keeping their appointments?: Doesn't understand importance (6/14/2023  9:10 AM)  Care Management Interventions: Advised patient to keep appointment (6/14/2023  9:10 AM)    Self Management  What is the home health agency?: na (6/14/2023  9:10 AM)  Has home health visited the patient within 72 hours of discharge?: Not applicable (6/14/2023  9:10 AM)  What Durable Medical Equipment (DME) was ordered?: na (6/14/2023  9:10 AM)    Patient Teaching  Does the patient have access  to their discharge instructions?: Yes (6/14/2023  9:10 AM)  Care Management Interventions: Reviewed instructions with patient (6/14/2023  9:10 AM)  What is the patient's perception of their health status since discharge?: Improving (6/14/2023  9:10 AM)  Is the patient/caregiver able to teach back the hierarchy of who to call/visit for symptoms/problems? PCP, Specialist, Home Health nurse, Urgent Care, ED, 911: Yes (6/14/2023  9:10 AM)    Wrap Up  Call End Time: 0910 (6/14/2023  9:10 AM)        No follow-ups on file.

## 2023-06-27 NOTE — PROGRESS NOTES
Call regarding appt. with PCP on (21 June 23) after hospitalization.  At time of outreach call the patient feels as if their condition has improved since last visit.  Reviewed the PCP appointment with the pt and addressed any questions or concerns.

## 2023-07-17 PROBLEM — N63.41 SUBAREOLAR MASS OF RIGHT BREAST: Status: ACTIVE | Noted: 2023-01-01

## 2023-07-17 PROBLEM — N25.81 HYPERPARATHYROIDISM DUE TO RENAL INSUFFICIENCY (MULTI): Status: RESOLVED | Noted: 2021-12-21 | Resolved: 2023-01-01

## 2023-07-17 NOTE — PROGRESS NOTES
"Patient Name:  Flex Vargas  MRN:  [id number]  :  1955    Subjective   Patient ID: Flex Vargas is a 67 y.o. male who presents for Follow-up (Lump under right nipple under the skin painful to the touch ).    HPI     68 yo male presents for follow up    BP holding stable after switch from lisinopril to entresto     New concern today with lump that is painful under the R nipple   No drainage  No FH of breast cancer  No change in size  No new medications     Review of Systems    Objective   /70   Pulse 55   Resp 16   Ht 1.753 m (5' 9\")   Wt 97.5 kg (215 lb)   SpO2 96%   BMI 31.75 kg/m²     Physical Exam  Constitutional:       Appearance: Normal appearance.   Cardiovascular:      Rate and Rhythm: Normal rate and regular rhythm.   Pulmonary:      Effort: Pulmonary effort is normal. No respiratory distress.      Breath sounds: No wheezing.   Chest:          Comments: Firm subareolar mass, well circumscribed, tender to palpation  Skin:     Coloration: Skin is not jaundiced or pale.   Neurological:      General: No focal deficit present.      Mental Status: He is alert and oriented to person, place, and time.   Psychiatric:         Mood and Affect: Mood normal.         Behavior: Behavior normal.     Assessment/Plan   Problem List Items Addressed This Visit       Hypertension - Primary     Goal BP >130/80  Continue norvasc 10mg, imdur, hydralazine, entresto, catapres  Work on maintaining a healthy weight, monitoring sodium intake, consistent activity and exercise  Avoid chronic use of NSAIDs  Do not use sudafed for decongestant when ill           GERD (gastroesophageal reflux disease)    Relevant Medications    Protonix 40 mg EC tablet    Other Relevant Orders    Follow Up In [facility] - PCP - Established    Dialysis patient (CMS/[facility])     Dialysis schedule M, T, Th, Sat  Nephrology managing labs and appointments for dialysis   IV Iron Administrations  Erythropoietin-Stimulating Agents " (ALDAIR) Administrations  Vitamin D Analogue Administrations  PTH within target          Relevant Orders    Hemoglobin A1c    CBC and Auto Differential    Lipid Panel    CBC and Auto Differential    Vitamin B12    Vitamin D 25-Hydroxy,Total    End stage renal disease (CMS/HCC)    Relevant Orders    Hemoglobin A1c    CBC and Auto Differential    Lipid Panel    CBC and Auto Differential    Vitamin B12    Vitamin D 25-Hydroxy,Total    COPD (chronic obstructive pulmonary disease) (CMS/[facility])     Continue with pulm team improvement with better air quality   Anoro Ellipta, singulair, clariten, duoneb, albuterol         Subareolar mass of right breast    Relevant Orders    BI US breast complete right    Follow Up In [facility] - PCP - Established     Other Visit Diagnoses       Other specified abnormal findings of blood chemistry        Relevant Orders    Hemoglobin A1c

## 2023-07-17 NOTE — ASSESSMENT & PLAN NOTE
Continue with pulm team improvement with better air quality   Anoro Ellipta, singulair, clariten, duoneb, albuterol

## 2023-07-17 NOTE — ASSESSMENT & PLAN NOTE
Goal BP >130/80  Continue norvasc 10mg, imdur, hydralazine, entresto, catapres  Work on maintaining a healthy weight, monitoring sodium intake, consistent activity and exercise  Avoid chronic use of NSAIDs  Do not use sudafed for decongestant when ill

## 2023-07-17 NOTE — ASSESSMENT & PLAN NOTE
Dialysis schedule M, T, Th, Sat  Nephrology managing labs and appointments for dialysis   IV Iron Administrations  Erythropoietin-Stimulating Agents (ALDAIR) Administrations  Vitamin D Analogue Administrations  PTH within target

## 2023-07-26 NOTE — TELEPHONE ENCOUNTER
Protonix script needs changed to generic form sent to [facility] in [place] insurance comp[any does not cover brand name for this medication, patient has not had for a week fyi  please call once completed [phone] Neyda

## 2023-07-26 NOTE — TELEPHONE ENCOUNTER
----- Message from Jasmyn Josue DO sent at 7/26/2023  6:59 AM EDT -----  Xray showing benign gynecomastia(male breast growth), nothing that looks suspicious of malignancy. Sometimes the medication he is on, spironalactone, can cause this. I would have him bring it up to his heart specialist, but know that that medication is helping manage his CHF    [id number]

## 2023-09-12 NOTE — PROGRESS NOTES
90 day outreach complete. Pt states he is feeling well. Pt has no questions at this time. Pt has graduated from TCM program.

## 2023-10-09 PROBLEM — K21.9 GERD (GASTROESOPHAGEAL REFLUX DISEASE): Chronic | Status: ACTIVE | Noted: 2021-08-23

## 2023-10-09 PROBLEM — Z00.00 MEDICARE ANNUAL WELLNESS VISIT, SUBSEQUENT: Status: ACTIVE | Noted: 2023-01-01

## 2023-10-09 PROBLEM — N18.6 END STAGE RENAL DISEASE (MULTI): Chronic | Status: ACTIVE | Noted: 2021-08-23

## 2023-10-09 PROBLEM — N19 RENAL FAILURE: Status: RESOLVED | Noted: 2023-01-01 | Resolved: 2023-01-01

## 2023-10-09 NOTE — ASSESSMENT & PLAN NOTE
Aspirin, brillinta  lipitor 80mg  Entresto, norvasc, hydralazine, coreg, catapress, isosorbide for BP control

## 2023-10-09 NOTE — ASSESSMENT & PLAN NOTE
PNEUMONIA vaccine- Declined  SHINGLES vaccine-Declined  INFLUENZA vaccine- Declined  Screening tests:  Colon cancer screening-->Due 2027  Prostate Cancer Screening--> Not Indicated  During the course of the visit the patient was educated and counseled about age appropriate screening and preventive services. Completed preventive screenings were documented in the chart and orders were placed for outstanding screenings/procedures as documented in the Assessment and Plan.  Patient Instructions (the written plan) was given to the patient at check out.

## 2023-10-09 NOTE — ASSESSMENT & PLAN NOTE
Medication and length of use: pantoprazole, years, unable to take holiday  Lifestyle modifications discussed with patient including:   Decreasing exacerbating foods including citric, acidic, spicy foods  Elevated head of the bed structurally or with pillows  Not eating 2 hours before laying down  Long term potential risks of PPIs discussed including C diff, pneumonia, B12 deficiency discussed

## 2023-10-09 NOTE — PROGRESS NOTES
"Subjective   Reason for Visit: Flex Vargas is an 67 y.o. male here for a Medicare Wellness visit.     Past Medical, Surgical, and Family History reviewed and updated in chart.    Reviewed all medications by prescribing practitioner or clinical pharmacist (such as prescriptions, OTCs, herbal therapies and supplements) and documented in the medical record.    HPI    66 yo male presents for medicare wellness visit  Has beagels trains them to tack- his best dog recently passed  In need of refill on \"stomach\" medicine     Patient Care Team:  Jasmyn Josue DO as PCP - General (Family Medicine)  Los Melton MD as PCP - [employer]vantage PCP  DO Vic Moore MD as Consulting Physician (Cardiology)  Linda Reynoso DO as Consulting Physician (Nephrology)  ALAYNA Morocho-CNP as Nurse Practitioner (Sleep Medicine)  Matthew Yo MD as Consulting Physician (Cardiology)     Review of Systems   Constitutional:  Negative for fatigue and fever.   Respiratory:  Negative for cough and shortness of breath.    Cardiovascular:  Negative for chest pain, palpitations and leg swelling.   Musculoskeletal:  Positive for arthralgias.   Allergic/Immunologic: Positive for immunocompromised state.   Neurological:  Negative for facial asymmetry and light-headedness.   Hematological:  Does not bruise/bleed easily.   Psychiatric/Behavioral:  Negative for confusion, decreased concentration and dysphoric mood.      Objective   Vitals:  /66   Pulse 56   Resp 16   Ht 1.753 m (5' 9\")   Wt 101 kg (222 lb 3.2 oz)   SpO2 97%   BMI 32.81 kg/m²       Physical Exam  Constitutional:       Appearance: Normal appearance.   HENT:      Head: Normocephalic and atraumatic.      Right Ear: Tympanic membrane normal.      Left Ear: Tympanic membrane normal.      Nose: No congestion.      Mouth/Throat:      Pharynx: No oropharyngeal exudate or posterior oropharyngeal erythema.   Eyes:     "  Extraocular Movements: Extraocular movements intact.   Cardiovascular:      Rate and Rhythm: Normal rate and regular rhythm.   Pulmonary:      Effort: Pulmonary effort is normal. No respiratory distress.   Abdominal:      General: There is no distension.   Musculoskeletal:      Right lower leg: No edema.      Left lower leg: No edema.   Neurological:      General: No focal deficit present.      Mental Status: He is oriented to person, place, and time.   Psychiatric:         Behavior: Behavior normal.     Assessment/Plan   Problem List Items Addressed This Visit       Hypothyroidism    Relevant Orders    Tsh With Reflex To Free T4 If Abnormal    Hyperlipidemia    Relevant Orders    Lipid Panel    Hemoglobin A1c    GERD (gastroesophageal reflux disease) (Chronic)    Current Assessment & Plan     Medication and length of use: pantoprazole, years, unable to take holiday  Lifestyle modifications discussed with patient including:   Decreasing exacerbating foods including citric, acidic, spicy foods  Elevated head of the bed structurally or with pillows  Not eating 2 hours before laying down  Long term potential risks of PPIs discussed including C diff, pneumonia, B12 deficiency discussed           Relevant Medications    pantoprazole (Protonix) 40 mg EC tablet    Other Relevant Orders    Follow Up In [facility] - PCP - Established    End stage renal disease (CMS/HCC) (Chronic)    Overview     Dialysis schedule M, T, Th, Sat  Nephrology managing labs and appointments for dialysis   IV Iron Administrations  Erythropoietin-Stimulating Agents (ALDAIR) Administrations  Vitamin D Analogue Administrations  PTH within target         Relevant Orders    Comprehensive metabolic panel    CBC and Auto Differential    Vitamin D 1,25 Dihydroxy (for eval of hypercalcemia)    Vitamin B12    Hemoglobin A1c    Atherosclerotic heart disease of native coronary artery with other forms of angina pectoris (CMS/HCC) (Chronic)    Current  Assessment & Plan     Aspirin, brillinta  lipitor 80mg  Entresto, norvasc, hydralazine, coreg, catapress, isosorbide for BP control         Medicare annual wellness visit, subsequent - Primary    Current Assessment & Plan     PNEUMONIA vaccine- Declined  SHINGLES vaccine-Declined  INFLUENZA vaccine- Declined  Screening tests:  Colon cancer screening-->Due 2027  Prostate Cancer Screening--> Not Indicated  During the course of the visit the patient was educated and counseled about age appropriate screening and preventive services. Completed preventive screenings were documented in the chart and orders were placed for outstanding screenings/procedures as documented in the Assessment and Plan.  Patient Instructions (the written plan) was given to the patient at check out.            Other Visit Diagnoses       Abnormal finding of blood chemistry, unspecified        Relevant Orders    Hemoglobin A1c    Encounter for hepatitis C screening test for low risk patient        Relevant Orders    Hepatitis C antibody

## 2023-10-12 NOTE — PROGRESS NOTES
"Counseling:  The patient was counseled regarding diagnostic results, instructions for management, risk factor reductions, prognosis, patient and family education, impressions, risks and benefits of treatment options and importance of compliance with treatment.      Chief Complaint:   The patient presents today for 3.5-month followup of CAD, heart failure, HTN and ascending aortic dilatation.       History Of Present Illness:    Flex Vargas is a 67 year old male patient who presents today for 3.5-month followup of CAD, heart failure/CMP, HTN and ascending aortic dilatation. His PMH is significant for CAD s/p PCI to LAD in 2017 and s/p PCI to proximal RCA 08/08/2022, chronic systolic heart failure/ischemic CMP, ascending aorta dilatation, HTN, CKD, LUE AV fistula, hypothyroidism and aortic valve regurgitation. Today, the patient states that at about 2 hours into dialysis he develops chest tightness, palpitations/fluttering and fatigue. With these symptoms, his HR occasionally drops into the 40s. The next day following dialysis, he feels well with resolution of symptoms. The patient is compliant with his prescribed medications.      Last Recorded Vitals:  Vitals:    10/16/23 1058   BP: (!) 182/100   BP Location: Right arm   Pulse: 53   Weight: 102 kg (224 lb)   Height: 1.753 m (5' 9\")       Past Surgical History:  He has a past surgical history that includes Coronary angioplasty (04/05/2017) and Tonsillectomy (04/05/2017).      Social History:  He reports that he quit smoking about 6 years ago. His smoking use included cigarettes. He has never used smokeless tobacco. He reports that he does not drink alcohol and does not use drugs.    Family History:  Family History   Problem Relation Name Age of Onset    Coronary artery disease Father          Allergies:  Patient has no known allergies.    Outpatient Medications:  Current Outpatient Medications   Medication Instructions    albuterol 90 mcg/actuation inhaler 2 " puffs, inhalation, 6 times daily    amLODIPine (Norvasc) 10 mg tablet 1 tablet, oral, Daily RT    aspirin 81 mg EC tablet 1 tablet, oral, Daily RT    Brilinta 90 mg tablet 1 tablet, oral, 2 times daily    calcitriol (Rocaltrol) 0.25 mcg capsule 1 capsule, oral, Daily    carvedilol (Coreg) 25 mg tablet 1 tablet, oral, 2 times daily    cholecalciferol (Vitamin D-3) 25 MCG (1000 UT) tablet 1 tablet, oral, Daily RT    cinacalcet HCl (SENSIPAR ORAL) 120 mg, oral    cloNIDine (Catapres) 0.3 mg tablet oral, 2 times daily    docusate sodium (Colace) 100 mg tablet 1 tablet, oral, Daily    Entresto  mg tablet 1 tablet, oral, 2 times daily    furosemide (LASIX) 80 mg, oral, Daily    hydrALAZINE (Apresoline) 100 mg tablet 1 tablet, oral    ipratropium-albuteroL (Duo-Neb) 0.5-2.5 mg/3 mL nebulizer solution 3 mL, inhalation, Every 4 hours PRN    isosorbide dinitrate (ISORDIL) 30 mg, oral, 3 times daily    isosorbide mononitrate ER (IMDUR) 60 mg, oral, Daily    Keppra 500 mg tablet oral    lidocaine-prilocaine (Emla) 2.5-2.5 % cream APPLY SMALL AMOUNT TO DIALYSIS ACCESS 30 MINUTES PRIOR TO DIALYSIS. COVER WITH SARAN WRAP    Lipitor 80 mg tablet 1 tablet, oral, Daily RT    loratadine (CLARITIN) 10 mg, oral, Daily    methoxy peg-epoetin beta (MIRCERA INJ) 75 mcg, Every 14 days    minoxidil (LONITEN) 2.5 mg, oral, Daily    montelukast (Singulair) 10 mg tablet 1 tablet, oral, Daily RT    pantoprazole (PROTONIX) 40 mg, oral, Daily PRN    sevelamer carbonate (Renvela) 800 mg tablet 1 tablet, oral    spironolactone (Aldactone) 25 mg tablet 1 tablet, oral, 2 times daily    Synthroid 100 mcg tablet 1 tablet, oral, Daily RT    umeclidinium-vilanteroL (Anoro Ellipta) 62.5-25 mcg/actuation blister with device inhalation    Velphoro 500 mg tablet,chewable chewable tablet CHEW AND SWALLOW 2 TABLETS BY MOUTH THREE TIMES DAILY WITH MEALS     Review of Systems   Cardiovascular:         Chest tightness, palpitations/fluttering and fatigue  with dialysis   All other systems reviewed and are negative.     Physical Exam:  Constitutional:       Appearance: Healthy appearance. Not in distress.   Neck:      Vascular: No JVR. JVD normal.   Pulmonary:      Effort: Pulmonary effort is normal.      Breath sounds: Normal breath sounds. No wheezing. No rhonchi. No rales.   Chest:      Chest wall: Not tender to palpatation.   Cardiovascular:      PMI at left midclavicular line. Normal rate. Regular rhythm. Normal S1. Normal S2.       Murmurs: There is no murmur.      No gallop.  No click. No rub.   Pulses:     Intact distal pulses.   Edema:     Peripheral edema absent.   Abdominal:      General: Bowel sounds are normal.      Palpations: Abdomen is soft.      Tenderness: There is no abdominal tenderness.   Musculoskeletal: Normal range of motion.         General: No tenderness. Skin:     General: Skin is warm and dry.   Neurological:      General: No focal deficit present.      Mental Status: Alert and oriented to person, place and time.       Last Labs:  CBC -  Lab Results   Component Value Date    WBC CANCELED 06/30/2023    HGB CANCELED 06/30/2023    HCT CANCELED 06/30/2023    MCV CANCELED 06/30/2023    PLT CANCELED 06/30/2023       CMP -  Lab Results   Component Value Date    CALCIUM CANCELED 06/30/2023    PHOS CANCELED 06/12/2023    PROT 8.1 06/11/2023    ALBUMIN CANCELED 06/12/2023    AST 18 06/11/2023    ALT 13 06/11/2023    ALKPHOS 61 06/11/2023    BILITOT 0.7 06/11/2023       LIPID PANEL -   Lab Results   Component Value Date    CHOL 121 12/14/2022    TRIG 76 12/14/2022    HDL 35.9 (A) 12/14/2022    CHHDL 3.4 12/14/2022    LDLF 70 12/14/2022    VLDL 15 12/14/2022       RENAL FUNCTION PANEL -   Lab Results   Component Value Date    GLUCOSE CANCELED 06/30/2023    NA CANCELED 06/30/2023    K CANCELED 06/30/2023    CL CANCELED 06/30/2023    CO2 CANCELED 06/30/2023    ANIONGAP CANCELED 06/30/2023    BUN CANCELED 06/30/2023    CREATININE CANCELED 06/30/2023     GFRMALE CANCELED 06/30/2023    CALCIUM CANCELED 06/30/2023    PHOS CANCELED 06/12/2023    ALBUMIN CANCELED 06/12/2023        Lab Results   Component Value Date    BNP CANCELED 06/30/2023    HGBA1C 5.4 12/14/2022       Last Cardiology Tests:  06/11/2023 - CXR  1. Pulmonary venous hypertension, manifesting as cardiogenic pulmonary edema, worsened from 2 months ago.  2. Lung bilateral perihilar/basilar consolidation.  3. Chronic cardiomegaly.     03/24/2023 - TTE  1. Left ventricular systolic function is mildly to moderately decreased with a 40% estimated ejection fraction.  2. Spectral Doppler shows an impaired relaxation pattern of left ventricular diastolic filling.  3. The left atrium is mild to moderately dilated.  4. The right atrium is mild to moderately dilated.  5. There is global hypokinesis of the left ventricle with minor regional variations.     11/10/2022 - TTE  1. Left ventricular systolic function is moderately to severely decreased with a 30-35% estimated ejection fraction.  2. Akinetic basal inferior and basal inferolateral segments.  3. Spectral Doppler shows a pseudonormal pattern of left ventricular diastolic filling.  4. The left atrium is moderately dilated.  5. Left ventricular cavity size is severely dilated.  6. There is global hypokinesis of the left ventricle with minor regional variations.  7. There is moderate dilatation of the ascending aorta.     11/10/2022 - CXR  Cardiomegaly with mild pulmonary vascular congestion. Interval worsening perihilar and bilateral interstitial opacities concerning for worsening pulmonary edema. Continued short-term follow-up to resolution is advised.     08/08/2022 - Cardiac Catheterization (LH)  1. Moderate single vessel disease involving the proximal RCA. Patent LAD stent.  2. RA 10, RV 54/2, PA 45/17-27, PCWP 36, LV 20, CO 14, CI 6.4.  3. RFR RCA 0.89. FFR deferred due to adenosine in setting of COPD.  4. PCI to Proximal RCA with 5.0 x 22 Resolute Brian  BRAD.  5. Elevated RHC filling pressures in setting of normal CO.     12/01/2021 - CTA Chest  1. There is loss of the sinotubular waist and mild dilatation to 4.2 cm of the ascending aorta from the sinus of Valsalva to the mid ascending aorta.  2. Coronary artery atherosclerosis.     09/01/2021 - TTE  1. The left ventricular systolic function is mildly decreased with a 45-50% estimated ejection fraction.  2. Spectral Doppler shows an impaired relaxation pattern of left ventricular diastolic filling.  3. The left atrium is severely dilated.  4. There is mild aortic valve regurgitation.  5. The left ventricular cavity size is moderately dilated.  6. There is global hypokinesis of the left ventricle with minor regional variations.  7. There is moderate dilatation of the ascending aorta.      12/14/2020 - TTE  1. The left ventricular systolic function is low normal with a 45-50% estimated ejection fraction.  2. Spectral Doppler shows an impaired relaxation pattern of left ventricular diastolic filling.  3. There is moderate concentric left ventricular hypertrophy.  4. There is mild aortic valve regurgitation.  5. There is global hypokinesis of the left ventricle with minor regional variations.     10/01/2019 - Echocardiogram   1. Mild left ventricular systolic dysfunction with regional abnormalities with an ejection fraction of 50%.  2. Mid anterolateral, apical lateral, apical anterior, and apical septal wall segments are hypokinetic.  3. Normal right ventricular size and systolic function.  4. Trace mitral regurgitation.  5. Moderate aortic regurgitation.     03/12/2018 - NM Cardiac Stress Test  1. Normal stress myocardial perfusion imaging in response to pharmacologic stress.  2. Mildly reduced left ventricular systolic function on post stress gated imaging.   3. Normal regadenoson stress test.      03/12/2018 - Echocardiogram   1. Normal left ventricular size and regional systolic function with an ejection fraction  of 55%.  2. Normal right ventricular size and systolic function.  3. Trace mitral regurgitation.  4. Mild aortic regurgitation.   5. Trace tricuspid regurgitation.  6. Ascending aorta dilatation (4.3 cm) and sinus of Valsalva (3.9 cm) dilatation.       Assessment/Plan   1) CAD s/p PCI of LAD 2017 and PCI RCA Aug 2022  [facility] Jan 2023 with patent stents  Continue DAPT - aspirin 81 mg daily and Brilinta 90 mg twice daily  Continue statin and beta blocker  Cardiac rehab complete  Reports chest tightness, palpitations/fluttering and fatigue with dialysis with associated occasional bradycardia into the 40s.  Symptoms resolve the day after dialysis  Check 14-day Holter     2) Chronic systolic heart failure  TTE December 2022 with LVEF 30%   Volume management is through hemodialysis  On Isosorbide 30 mg TID, carvedilol 25 mg BID, Entresto  mg BID, furosemide 80 mg BID, clonidine 0.3 mg BID, hydralazine 100 mg TID  TTE 03/24/2023 as ordered by EP with LVEF of 40%  Hospital admission 06/11/2023 to 06/13/2023 with SOB - EKG showed SR at a rate of 98 with ST depression in V6, BP elevated at 247/137, CXR with pulmonary venous hypertension (manifesting as cardiogenic pulmonary edema) and bilateral perihilar/basilar consolidation.     3) Ascending aorta dilatation  TTE Nov 2022 with ascending aorta 4.3 cm  Follow with annual TTE     4) HTN  Stable  Continue amlodipine 10 mg daily, isosorbide mononitrate 30 mg daily, hydralazine 100 mg TID  Less than 2 gram sodium diet     5) ESRD on HD T-Th-Sat  Follows with nephrology      Scribe Attestation  By signing my name below, I, Mirela Ochoa   attest that this documentation has been prepared under the direction and in the presence of Vic Pandya MD.

## 2023-10-16 NOTE — PROGRESS NOTES
TSH elevated, but Improved from prior reading and t4 normal, this is called subclinical hypothyroidism. We follow up in either A. If no symptoms then repeat in 3 months and continue to track and treat only if both numbers are abnormal or symptoms develop or B. if you are having symptoms such as constipation, thinning hair/brittle nails, fatigue, cold sensitivity, weight gain then we should go ahead we should preemptively increase thyroid dose.

## 2023-10-16 NOTE — LETTER
"October 16, 2023     Jasmyn Josue DO  [address], [address]  [facility] [address]    Patient: Flex Vargas   YOB: 1955   Date of Visit: 10/16/2023       Dear Dr. Jasmyn Josue DO:    Thank you for referring Flex Vargas to me for evaluation. Below are my notes for this consultation.  If you have questions, please do not hesitate to call me. I look forward to following your patient along with you.       Sincerely,     Vic Pandya MD      CC: No Recipients  ______________________________________________________________________________________    Counseling:  The patient was counseled regarding diagnostic results, instructions for management, risk factor reductions, prognosis, patient and family education, impressions, risks and benefits of treatment options and importance of compliance with treatment.      Chief Complaint:   The patient presents today for 3.5-month followup of CAD, heart failure, HTN and ascending aortic dilatation.       History Of Present Illness:    Flex Vargas is a 67 year old male patient who presents today for 3.5-month followup of CAD, heart failure/CMP, HTN and ascending aortic dilatation. His PMH is significant for CAD s/p PCI to LAD in 2017 and s/p PCI to proximal RCA 08/08/2022, chronic systolic heart failure/ischemic CMP, ascending aorta dilatation, HTN, CKD, LUE AV fistula, hypothyroidism and aortic valve regurgitation. Today, the patient states that at about 2 hours into dialysis he develops chest tightness, palpitations/fluttering and fatigue. With these symptoms, his HR occasionally drops into the 40s. The next day following dialysis, he feels well with resolution of symptoms. The patient is compliant with his prescribed medications.      Last Recorded Vitals:  Vitals:    10/16/23 1058   BP: (!) 182/100   BP Location: Right arm   Pulse: 53   Weight: 102 kg (224 lb)   Height: 1.753 m (5' 9\")       Past Surgical History:  He has " a past surgical history that includes Coronary angioplasty (04/05/2017) and Tonsillectomy (04/05/2017).      Social History:  He reports that he quit smoking about 6 years ago. His smoking use included cigarettes. He has never used smokeless tobacco. He reports that he does not drink alcohol and does not use drugs.    Family History:  Family History   Problem Relation Name Age of Onset   • Coronary artery disease Father          Allergies:  Patient has no known allergies.    Outpatient Medications:  Current Outpatient Medications   Medication Instructions   • albuterol 90 mcg/actuation inhaler 2 puffs, inhalation, 6 times daily   • amLODIPine (Norvasc) 10 mg tablet 1 tablet, oral, Daily RT   • aspirin 81 mg EC tablet 1 tablet, oral, Daily RT   • Brilinta 90 mg tablet 1 tablet, oral, 2 times daily   • calcitriol (Rocaltrol) 0.25 mcg capsule 1 capsule, oral, Daily   • carvedilol (Coreg) 25 mg tablet 1 tablet, oral, 2 times daily   • cholecalciferol (Vitamin D-3) 25 MCG (1000 UT) tablet 1 tablet, oral, Daily RT   • cinacalcet HCl (SENSIPAR ORAL) 120 mg, oral   • cloNIDine (Catapres) 0.3 mg tablet oral, 2 times daily   • docusate sodium (Colace) 100 mg tablet 1 tablet, oral, Daily   • Entresto  mg tablet 1 tablet, oral, 2 times daily   • furosemide (LASIX) 80 mg, oral, Daily   • hydrALAZINE (Apresoline) 100 mg tablet 1 tablet, oral   • ipratropium-albuteroL (Duo-Neb) 0.5-2.5 mg/3 mL nebulizer solution 3 mL, inhalation, Every 4 hours PRN   • isosorbide dinitrate (ISORDIL) 30 mg, oral, 3 times daily   • isosorbide mononitrate ER (IMDUR) 60 mg, oral, Daily   • Keppra 500 mg tablet oral   • lidocaine-prilocaine (Emla) 2.5-2.5 % cream APPLY SMALL AMOUNT TO DIALYSIS ACCESS 30 MINUTES PRIOR TO DIALYSIS. COVER WITH SARAN WRAP   • Lipitor 80 mg tablet 1 tablet, oral, Daily RT   • loratadine (CLARITIN) 10 mg, oral, Daily   • methoxy peg-epoetin beta (MIRCERA INJ) 75 mcg, Every 14 days   • minoxidil (LONITEN) 2.5 mg, oral,  Daily   • montelukast (Singulair) 10 mg tablet 1 tablet, oral, Daily RT   • pantoprazole (PROTONIX) 40 mg, oral, Daily PRN   • sevelamer carbonate (Renvela) 800 mg tablet 1 tablet, oral   • spironolactone (Aldactone) 25 mg tablet 1 tablet, oral, 2 times daily   • Synthroid 100 mcg tablet 1 tablet, oral, Daily RT   • umeclidinium-vilanteroL (Anoro Ellipta) 62.5-25 mcg/actuation blister with device inhalation   • Velphoro 500 mg tablet,chewable chewable tablet CHEW AND SWALLOW 2 TABLETS BY MOUTH THREE TIMES DAILY WITH MEALS     Review of Systems   Cardiovascular:         Chest tightness, palpitations/fluttering and fatigue with dialysis   All other systems reviewed and are negative.     Physical Exam:  Constitutional:       Appearance: Healthy appearance. Not in distress.   Neck:      Vascular: No JVR. JVD normal.   Pulmonary:      Effort: Pulmonary effort is normal.      Breath sounds: Normal breath sounds. No wheezing. No rhonchi. No rales.   Chest:      Chest wall: Not tender to palpatation.   Cardiovascular:      PMI at left midclavicular line. Normal rate. Regular rhythm. Normal S1. Normal S2.       Murmurs: There is no murmur.      No gallop.  No click. No rub.   Pulses:     Intact distal pulses.   Edema:     Peripheral edema absent.   Abdominal:      General: Bowel sounds are normal.      Palpations: Abdomen is soft.      Tenderness: There is no abdominal tenderness.   Musculoskeletal: Normal range of motion.         General: No tenderness. Skin:     General: Skin is warm and dry.   Neurological:      General: No focal deficit present.      Mental Status: Alert and oriented to person, place and time.       Last Labs:  CBC -  Lab Results   Component Value Date    WBC CANCELED 06/30/2023    HGB CANCELED 06/30/2023    HCT CANCELED 06/30/2023    MCV CANCELED 06/30/2023    PLT CANCELED 06/30/2023       CMP -  Lab Results   Component Value Date    CALCIUM CANCELED 06/30/2023    PHOS CANCELED 06/12/2023    PROT 8.1  06/11/2023    ALBUMIN CANCELED 06/12/2023    AST 18 06/11/2023    ALT 13 06/11/2023    ALKPHOS 61 06/11/2023    BILITOT 0.7 06/11/2023       LIPID PANEL -   Lab Results   Component Value Date    CHOL 121 12/14/2022    TRIG 76 12/14/2022    HDL 35.9 (A) 12/14/2022    CHHDL 3.4 12/14/2022    LDLF 70 12/14/2022    VLDL 15 12/14/2022       RENAL FUNCTION PANEL -   Lab Results   Component Value Date    GLUCOSE CANCELED 06/30/2023    NA CANCELED 06/30/2023    K CANCELED 06/30/2023    CL CANCELED 06/30/2023    CO2 CANCELED 06/30/2023    ANIONGAP CANCELED 06/30/2023    BUN CANCELED 06/30/2023    CREATININE CANCELED 06/30/2023    GFRMALE CANCELED 06/30/2023    CALCIUM CANCELED 06/30/2023    PHOS CANCELED 06/12/2023    ALBUMIN CANCELED 06/12/2023        Lab Results   Component Value Date    BNP CANCELED 06/30/2023    HGBA1C 5.4 12/14/2022       Last Cardiology Tests:  06/11/2023 - CXR  1. Pulmonary venous hypertension, manifesting as cardiogenic pulmonary edema, worsened from 2 months ago.  2. Lung bilateral perihilar/basilar consolidation.  3. Chronic cardiomegaly.     03/24/2023 - TTE  1. Left ventricular systolic function is mildly to moderately decreased with a 40% estimated ejection fraction.  2. Spectral Doppler shows an impaired relaxation pattern of left ventricular diastolic filling.  3. The left atrium is mild to moderately dilated.  4. The right atrium is mild to moderately dilated.  5. There is global hypokinesis of the left ventricle with minor regional variations.     11/10/2022 - TTE  1. Left ventricular systolic function is moderately to severely decreased with a 30-35% estimated ejection fraction.  2. Akinetic basal inferior and basal inferolateral segments.  3. Spectral Doppler shows a pseudonormal pattern of left ventricular diastolic filling.  4. The left atrium is moderately dilated.  5. Left ventricular cavity size is severely dilated.  6. There is global hypokinesis of the left ventricle with minor  regional variations.  7. There is moderate dilatation of the ascending aorta.     11/10/2022 - CXR  Cardiomegaly with mild pulmonary vascular congestion. Interval worsening perihilar and bilateral interstitial opacities concerning for worsening pulmonary edema. Continued short-term follow-up to resolution is advised.     08/08/2022 - Cardiac Catheterization (LH)  1. Moderate single vessel disease involving the proximal RCA. Patent LAD stent.  2. RA 10, RV 54/2, PA 45/17-27, PCWP 36, LV 20, CO 14, CI 6.4.  3. RFR RCA 0.89. FFR deferred due to adenosine in setting of COPD.  4. PCI to Proximal RCA with 5.0 x 22 Resolute Brian BRAD.  5. Elevated RHC filling pressures in setting of normal CO.     12/01/2021 - CTA Chest  1. There is loss of the sinotubular waist and mild dilatation to 4.2 cm of the ascending aorta from the sinus of Valsalva to the mid ascending aorta.  2. Coronary artery atherosclerosis.     09/01/2021 - TTE  1. The left ventricular systolic function is mildly decreased with a 45-50% estimated ejection fraction.  2. Spectral Doppler shows an impaired relaxation pattern of left ventricular diastolic filling.  3. The left atrium is severely dilated.  4. There is mild aortic valve regurgitation.  5. The left ventricular cavity size is moderately dilated.  6. There is global hypokinesis of the left ventricle with minor regional variations.  7. There is moderate dilatation of the ascending aorta.      12/14/2020 - TTE  1. The left ventricular systolic function is low normal with a 45-50% estimated ejection fraction.  2. Spectral Doppler shows an impaired relaxation pattern of left ventricular diastolic filling.  3. There is moderate concentric left ventricular hypertrophy.  4. There is mild aortic valve regurgitation.  5. There is global hypokinesis of the left ventricle with minor regional variations.     10/01/2019 - Echocardiogram   1. Mild left ventricular systolic dysfunction with regional abnormalities  with an ejection fraction of 50%.  2. Mid anterolateral, apical lateral, apical anterior, and apical septal wall segments are hypokinetic.  3. Normal right ventricular size and systolic function.  4. Trace mitral regurgitation.  5. Moderate aortic regurgitation.     03/12/2018 - NM Cardiac Stress Test  1. Normal stress myocardial perfusion imaging in response to pharmacologic stress.  2. Mildly reduced left ventricular systolic function on post stress gated imaging.   3. Normal regadenoson stress test.      03/12/2018 - Echocardiogram   1. Normal left ventricular size and regional systolic function with an ejection fraction of 55%.  2. Normal right ventricular size and systolic function.  3. Trace mitral regurgitation.  4. Mild aortic regurgitation.   5. Trace tricuspid regurgitation.  6. Ascending aorta dilatation (4.3 cm) and sinus of Valsalva (3.9 cm) dilatation.       Assessment/Plan  1) CAD s/p PCI of LAD 2017 and PCI RCA Aug 2022  [facility] Jan 2023 with patent stents  Continue DAPT - aspirin 81 mg daily and Brilinta 90 mg twice daily  Continue statin and beta blocker  Cardiac rehab complete  Reports chest tightness, palpitations/fluttering and fatigue with dialysis with associated occasional bradycardia into the 40s.  Symptoms resolve the day after dialysis  Check 14-day Holter     2) Chronic systolic heart failure  TTE December 2022 with LVEF 30%   Volume management is through hemodialysis  On Isosorbide 30 mg TID, carvedilol 25 mg BID, Entresto  mg BID, furosemide 80 mg BID, clonidine 0.3 mg BID, hydralazine 100 mg TID  TTE 03/24/2023 as ordered by EP with LVEF of 40%  Hospital admission 06/11/2023 to 06/13/2023 with SOB - EKG showed SR at a rate of 98 with ST depression in V6, BP elevated at 247/137, CXR with pulmonary venous hypertension (manifesting as cardiogenic pulmonary edema) and bilateral perihilar/basilar consolidation.     3) Ascending aorta dilatation  TTE Nov 2022 with ascending aorta 4.3  cm  Follow with annual TTE     4) HTN  Stable  Continue amlodipine 10 mg daily, isosorbide mononitrate 30 mg daily, hydralazine 100 mg TID  Less than 2 gram sodium diet     5) ESRD on HD T-Th-Sat  Follows with nephrology      Scribe Attestation  By signing my name below, I, Mirela Ochoa   attest that this documentation has been prepared under the direction and in the presence of Vic Pandya MD.

## 2023-10-16 NOTE — PATIENT INSTRUCTIONS
Continue all current medications as prescribed. Refills for furosemide 80 mg twice daily have been sent to your pharmacy.  Dr. Pandya has ordered a heart monitor to assess your heart rhythm.  Followup with Dr. Pandya in 1 month.    If you have any questions or cardiac concerns, please call our office at [phone].

## 2023-10-20 NOTE — PATIENT INSTRUCTIONS
Thank you for coming in today. If you have any questions or concerns, you may call the Heart Failure Office at [phone] option [phone], or [phone].  You may also contact our heart failure nursing team via email on [email or web address].    For quicker results set-up your  [employer] account to receive results and other correspondence directly to your phone.    Please bring all your pills/medications to your Cardiology appointments.    **  - Please make the following medication changes:  1. INCREASE Isosorbide mononitrate to 90  mg once a day     -Please have blood tests done ( TSH, BNP)    -  Please have the following tests done, CALL Tel  [phone] to schedule:  Echocardiogram in 3-4 months    2. Cardiopulmonary stress test in 3-4 months     You will be referred to the following teams, CALL [phone]  to schedule your appointments with:  1. Cardiac rehabilitation    - Please make an appointment to be seen in 3-4 months
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
## 2024-01-19 ENCOUNTER — APPOINTMENT (OUTPATIENT)
Dept: CARDIOLOGY | Facility: HOSPITAL | Age: 69
End: 2024-01-19

## 2024-02-09 ENCOUNTER — APPOINTMENT (OUTPATIENT)
Dept: PRIMARY CARE | Facility: CLINIC | Age: 69
End: 2024-02-09
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
